# Patient Record
Sex: FEMALE | Race: WHITE | NOT HISPANIC OR LATINO | Employment: UNEMPLOYED | URBAN - METROPOLITAN AREA
[De-identification: names, ages, dates, MRNs, and addresses within clinical notes are randomized per-mention and may not be internally consistent; named-entity substitution may affect disease eponyms.]

---

## 2020-01-08 ENCOUNTER — OFFICE VISIT (OUTPATIENT)
Dept: URGENT CARE | Facility: MEDICAL CENTER | Age: 25
End: 2020-01-08
Payer: COMMERCIAL

## 2020-01-08 VITALS
BODY MASS INDEX: 29.44 KG/M2 | HEIGHT: 62 IN | RESPIRATION RATE: 18 BRPM | WEIGHT: 160 LBS | OXYGEN SATURATION: 100 % | SYSTOLIC BLOOD PRESSURE: 102 MMHG | DIASTOLIC BLOOD PRESSURE: 54 MMHG | HEART RATE: 75 BPM | TEMPERATURE: 97.8 F

## 2020-01-08 DIAGNOSIS — H10.9 CONJUNCTIVITIS OF LEFT EYE, UNSPECIFIED CONJUNCTIVITIS TYPE: Primary | ICD-10-CM

## 2020-01-08 PROCEDURE — 99213 OFFICE O/P EST LOW 20 MIN: CPT | Performed by: PHYSICIAN ASSISTANT

## 2020-01-08 RX ORDER — BENZONATATE 100 MG/1
CAPSULE ORAL
COMMUNITY
Start: 2020-01-05

## 2020-01-08 RX ORDER — POLYMYXIN B SULFATE AND TRIMETHOPRIM 1; 10000 MG/ML; [USP'U]/ML
1 SOLUTION OPHTHALMIC EVERY 4 HOURS
Qty: 10 ML | Refills: 0 | Status: SHIPPED | OUTPATIENT
Start: 2020-01-08 | End: 2020-01-15

## 2020-01-08 RX ORDER — FLUTICASONE PROPIONATE 50 MCG
SPRAY, SUSPENSION (ML) NASAL
COMMUNITY
Start: 2020-01-05

## 2020-01-08 RX ORDER — ALBUTEROL SULFATE 90 UG/1
AEROSOL, METERED RESPIRATORY (INHALATION)
COMMUNITY
Start: 2020-01-05

## 2020-01-08 NOTE — PATIENT INSTRUCTIONS
Conjunctivitis  polytrim as directed  Follow up with PCP in 3-5 days  Proceed to  ER if symptoms worsen  Conjunctivitis   WHAT YOU SHOULD KNOW:   Conjunctivitis, or pink eye, is inflammation of your conjunctiva  The conjunctiva is a thin tissue that covers the front of your eye and the back of your eyelids  The conjunctiva helps protect your eye and keep it moist         INSTRUCTIONS:   Medicines:   · Allergy medicine: This medicine helps decrease itchy, red, swollen eyes caused by allergies  It may be given as a pill, eye drops, or nasal spray  · Antibiotics:  You will need antibiotics if your conjunctivitis is caused by bacteria  This medicine may be given as eye drops or eye ointment  · Steroid medicine: This medicine helps decrease inflammation  It may be given as a pill, eye drops, or nasal spray  · Take your medicine as directed  Call your healthcare provider if you think your medicine is not helping or if you have side effects  Tell him if you are allergic to any medicine  Keep a list of the medicines, vitamins, and herbs you take  Include the amounts, and when and why you take them  Bring the list or the pill bottles to follow-up visits  Carry your medicine list with you in case of an emergency  Follow up with your primary healthcare provider as directed: You may need to return for more tests on your eyes  These will help your primary healthcare provider check for eye damage  Write down your questions so you remember to ask them during your visits  Avoid the spread of conjunctivitis:   · Wash your hands often:  Wash your hands before you touch your eyes  Also wash your hands before you prepare or eat food and after you use the bathroom or change a diaper  · Avoid allergens:  Try to avoid the things that cause your allergies, such as pets, dust, or grass  · Avoid contact:  Do not share towels or washcloths  Try to stay away from others as much as possible   Ask when you can return to work or school  · Throw away eye makeup:  Throw away mascara and other eye makeup  Manage your symptoms:  · Apply a cool compress:  Wet a washcloth with cold water and place it on your eye  This will help decrease swelling  · Use eye drops:  Eye drops, or artificial tears, can be bought without a doctor's order  They help keep your eye moist     · Do not wear contact lenses: They can irritate your eye  Throw away the pair you are using and ask when you can wear them again  Use a new pair of lenses when your primary healthcare provider says it is okay  · Flush your eye:  You may need to flush your eye with saline to help decrease your symptoms  Ask for more information on how to flush your eye  Contact your primary healthcare provider if:   · Your eyesight becomes blurry  · You have tiny bumps or spots of blood on your eye  · You have questions or concerns about your condition or care  Return to the emergency department if:   · The swelling in your eye gets worse, even after treatment  · Your vision suddenly becomes worse or you cannot see at all  · Your eye begins to bleed  © 2014 [address] is for End User's use only and may not be sold, redistributed or otherwise used for commercial purposes  All illustrations and images included in CareNotes® are the copyrighted property of [employer]  or [employer]  The above information is an  only  It is not intended as medical advice for individual conditions or treatments  Talk to your doctor, nurse or pharmacist before following any medical regimen to see if it is safe and effective for you

## 2020-01-08 NOTE — PROGRESS NOTES
[facility]        NAME: Nicolasa Fuentes is a 27 y o  female  : 1995    MRN: [id number]  DATE: 2020  TIME: 3:59 PM    Assessment and Plan   Conjunctivitis of left eye, unspecified conjunctivitis type [H10 9]  1  Conjunctivitis of left eye, unspecified conjunctivitis type           Patient Instructions     Conjunctivitis  polytrim as directed  Follow up with PCP in 3-5 days  Proceed to  ER if symptoms worsen  Chief Complaint     Chief Complaint   Patient presents with    Eye Problem     Skin surrounding pt's left eye is dry, burning and swollen x 1 week  History of Present Illness       28 y/o female presents c/o crusted lashes x 1 day and injected eye  Patient also c/o having used eczema cream on the eyelid and having some burning to the skin of the upper eyelid  Denies visual disturbances, eye pain, trauma      Review of Systems   Review of Systems   Constitutional: Negative  HENT: Negative  Eyes: Positive for discharge and redness  Negative for photophobia, pain, itching and visual disturbance  Respiratory: Negative  Negative for cough, chest tightness, shortness of breath, wheezing and stridor  Cardiovascular: Negative  Negative for chest pain, palpitations and leg swelling  Current Medications       Current Outpatient Medications:     albuterol (PROVENTIL HFA,VENTOLIN HFA) 90 mcg/act inhaler, , Disp: , Rfl:     benzonatate (TESSALON PERLES) 100 mg capsule, , Disp: , Rfl:     fluticasone (FLONASE) 50 mcg/act nasal spray, , Disp: , Rfl:     Prenatal w/o A Vit-Fe Fum-FA (PRENATA PO), Take by mouth, Disp: , Rfl:     Current Allergies     Allergies as of 2020    (No Known Allergies)            The following portions of the patient's history were reviewed and updated as appropriate: allergies, current medications, past family history, past medical history, past social history, past surgical history and problem list      History reviewed   No pertinent past medical history  History reviewed  No pertinent surgical history  Family History   Problem Relation Age of Onset    No Known Problems Mother     No Known Problems Father          Medications have been verified  Objective   /54   Pulse 75   Temp 97 8 °F (36 6 °C) (Temporal)   Resp 18   Ht 5' 2" (1 575 m)   Wt 72 6 kg (160 lb)   LMP 12/18/2019 (Approximate)   SpO2 100%   BMI 29 26 kg/m²        Physical Exam     Physical Exam   Constitutional: She appears well-developed and well-nourished  HENT:   Head: Normocephalic and atraumatic  Right Ear: External ear normal    Left Ear: External ear normal    Nose: Nose normal    Mouth/Throat: Oropharynx is clear and moist  No oropharyngeal exudate  Eyes: Pupils are equal, round, and reactive to light  EOM are normal  Right eye exhibits no chemosis, no discharge, no exudate and no hordeolum  No foreign body present in the right eye  Left eye exhibits exudate  Left eye exhibits no chemosis, no discharge and no hordeolum  No foreign body present in the left eye  Right conjunctiva is not injected  Right conjunctiva has no hemorrhage  Left conjunctiva is injected  Left conjunctiva has no hemorrhage  Neck: Normal range of motion  Neck supple  Cardiovascular: Normal rate, regular rhythm, normal heart sounds and intact distal pulses  Pulmonary/Chest: Effort normal and breath sounds normal  No stridor  No respiratory distress  She has no wheezes  She has no rales  She exhibits no tenderness  Lymphadenopathy:     She has no cervical adenopathy

## 2020-10-23 ENCOUNTER — HOSPITAL ENCOUNTER (EMERGENCY)
Facility: HOSPITAL | Age: 25
Discharge: HOME | End: 2020-10-23
Attending: EMERGENCY MEDICINE | Admitting: EMERGENCY MEDICINE

## 2020-10-23 VITALS
RESPIRATION RATE: 17 BRPM | HEART RATE: 63 BPM | HEIGHT: 63 IN | TEMPERATURE: 97.9 F | BODY MASS INDEX: 28.7 KG/M2 | WEIGHT: 162 LBS | OXYGEN SATURATION: 100 % | SYSTOLIC BLOOD PRESSURE: 118 MMHG | DIASTOLIC BLOOD PRESSURE: 62 MMHG

## 2020-10-23 DIAGNOSIS — J45.901 ASTHMA EXACERBATION: Primary | ICD-10-CM

## 2020-10-23 DIAGNOSIS — J40 BRONCHITIS: ICD-10-CM

## 2020-10-23 PROCEDURE — 99283 EMERGENCY DEPT VISIT LOW MDM: CPT

## 2020-10-23 PROCEDURE — 99284 EMERGENCY DEPT VISIT MOD MDM: CPT | Performed by: EMERGENCY MEDICINE

## 2020-10-23 RX ORDER — PREDNISONE 20 MG/1
40 TABLET ORAL DAILY
Qty: 10 TABLET | Refills: 0 | Status: SHIPPED | OUTPATIENT
Start: 2020-10-23 | End: 2020-10-28

## 2020-10-23 RX ORDER — ALBUTEROL SULFATE 90 UG/1
2 AEROSOL, METERED RESPIRATORY (INHALATION) ONCE
Status: COMPLETED | OUTPATIENT
Start: 2020-10-23 | End: 2020-10-23

## 2020-10-23 RX ORDER — AZITHROMYCIN 250 MG/1
250 TABLET, FILM COATED ORAL DAILY
Qty: 4 TABLET | Refills: 0 | Status: SHIPPED | OUTPATIENT
Start: 2020-10-23 | End: 2020-10-23 | Stop reason: CLARIF

## 2020-10-23 RX ORDER — AZITHROMYCIN 500 MG/1
500 TABLET, FILM COATED ORAL ONCE
Status: COMPLETED | OUTPATIENT
Start: 2020-10-23 | End: 2020-10-23

## 2020-10-23 RX ORDER — PREDNISONE 20 MG/1
60 TABLET ORAL ONCE
Status: COMPLETED | OUTPATIENT
Start: 2020-10-23 | End: 2020-10-23

## 2020-10-23 RX ORDER — AZITHROMYCIN 250 MG/1
250 TABLET, FILM COATED ORAL DAILY
Qty: 4 TABLET | Refills: 0 | Status: SHIPPED | OUTPATIENT
Start: 2020-10-23 | End: 2020-10-27

## 2020-10-23 RX ADMIN — ALBUTEROL SULFATE 2 PUFF: 90 AEROSOL, METERED RESPIRATORY (INHALATION) at 14:22

## 2020-10-23 RX ADMIN — AZITHROMYCIN 500 MG: 500 TABLET, FILM COATED ORAL at 14:22

## 2020-10-23 RX ADMIN — PREDNISONE 60 MG: 20 TABLET ORAL at 14:22

## 2021-12-19 ENCOUNTER — APPOINTMENT (EMERGENCY)
Dept: RADIOLOGY | Facility: HOSPITAL | Age: 26
End: 2021-12-19
Payer: COMMERCIAL

## 2021-12-19 ENCOUNTER — HOSPITAL ENCOUNTER (EMERGENCY)
Facility: HOSPITAL | Age: 26
Discharge: HOME | End: 2021-12-19
Attending: EMERGENCY MEDICINE | Admitting: EMERGENCY MEDICINE
Payer: COMMERCIAL

## 2021-12-19 VITALS
RESPIRATION RATE: 16 BRPM | SYSTOLIC BLOOD PRESSURE: 137 MMHG | OXYGEN SATURATION: 98 % | BODY MASS INDEX: 27.81 KG/M2 | TEMPERATURE: 97.8 F | HEART RATE: 82 BPM | WEIGHT: 157 LBS | DIASTOLIC BLOOD PRESSURE: 62 MMHG

## 2021-12-19 DIAGNOSIS — S61.309A NAIL AVULSION, FINGER, INITIAL ENCOUNTER: Primary | ICD-10-CM

## 2021-12-19 PROCEDURE — 73140 X-RAY EXAM OF FINGER(S): CPT

## 2021-12-19 PROCEDURE — 99283 EMERGENCY DEPT VISIT LOW MDM: CPT

## 2021-12-19 PROCEDURE — 11730 AVULSION NAIL PLATE SIMPLE 1: CPT | Performed by: EMERGENCY MEDICINE

## 2021-12-19 PROCEDURE — 99284 EMERGENCY DEPT VISIT MOD MDM: CPT | Performed by: EMERGENCY MEDICINE

## 2021-12-19 RX ORDER — AMOXICILLIN AND CLAVULANATE POTASSIUM 875; 125 MG/1; MG/1
1 TABLET, FILM COATED ORAL EVERY 12 HOURS
Qty: 14 TABLET | Refills: 0 | Status: SHIPPED | OUTPATIENT
Start: 2021-12-19 | End: 2021-12-26

## 2021-12-19 RX ORDER — LIDOCAINE HYDROCHLORIDE 10 MG/ML
5 INJECTION, SOLUTION EPIDURAL; INFILTRATION; INTRACAUDAL; PERINEURAL ONCE
Status: COMPLETED | OUTPATIENT
Start: 2021-12-19 | End: 2021-12-19

## 2021-12-19 RX ADMIN — LIDOCAINE HYDROCHLORIDE 5 ML: 10 INJECTION, SOLUTION EPIDURAL; INFILTRATION; INTRACAUDAL at 15:17

## 2022-06-14 ENCOUNTER — OFFICE VISIT (OUTPATIENT)
Dept: URGENT CARE | Facility: CLINIC | Age: 27
End: 2022-06-14
Payer: COMMERCIAL

## 2022-06-14 VITALS
OXYGEN SATURATION: 100 % | TEMPERATURE: 98.7 F | HEART RATE: 65 BPM | WEIGHT: 158 LBS | DIASTOLIC BLOOD PRESSURE: 59 MMHG | HEIGHT: 63 IN | RESPIRATION RATE: 18 BRPM | SYSTOLIC BLOOD PRESSURE: 98 MMHG | BODY MASS INDEX: 28 KG/M2

## 2022-06-14 DIAGNOSIS — H57.12 PAIN OF LEFT EYE: Primary | ICD-10-CM

## 2022-06-14 DIAGNOSIS — H21.569 ABNORMAL SIZE OF PUPIL: ICD-10-CM

## 2022-06-14 PROCEDURE — 99213 OFFICE O/P EST LOW 20 MIN: CPT | Performed by: NURSE PRACTITIONER

## 2022-06-14 RX ORDER — OFLOXACIN 3 MG/ML
SOLUTION/ DROPS OPHTHALMIC
COMMUNITY
Start: 2022-03-28

## 2022-06-14 RX ORDER — ATROPINE SULFATE 10 MG/ML
SOLUTION/ DROPS OPHTHALMIC
COMMUNITY
Start: 2022-03-28

## 2022-06-14 NOTE — PROGRESS NOTES
[facility]        NAME: Shailesh Dean is a 27 y o  female  : 1995    MRN: [id number]  DATE: 2022  TIME: 6:59 PM    Assessment and Plan   Pain of left eye [H57 12]  1  Pain of left eye  Transfer to other facility   2  Abnormal size of pupil       --No signs of overt FB, trauma noted  Consider hidden FB (glass), retained suture, acute angle glaucoma, other causes of eye pain, dilated pupil in patient with recent history of surgery for glass in eye  Emphasized need for urgent ophthalmologist exam this evening (local ER vs  [facility] in [place] where she had surgery--preferred)  Patient agreeable to this plan  Patient Instructions     --Advise going to ER this evening for ongoing eye pain, dilated pupil [facility] preferable)    Chief Complaint     Chief Complaint   Patient presents with    Eye Pain     Throbbing, piercing pain in Lt eye started this morning  Pt states she had Lt eye surgery back in 2022 (glass removed from eye)  Feels like something in eye  No burning  Mild redness  Pt states she was cleared for glasses 1 mth ago  Did not contact pcp or eye dr           History of Present Illness         Here with complaints of left eye pain  Woke up with it this morning  Throbbing, piercing sensation localized to medial superior portion of left eye (in area where she had surgery 3/2022 at [facility] for glass in eye)  Associated mild blurry vision, photophobia  Minimal tearing  Pain is constant, rated 7/10  No associated N/V  No concurrent URI symptoms  Does not recall any new injuries, trauma, FB  Eye was fine when she went to bed  Instilled antibiotic eye drops but no other drops (including atropine)  Wears glasses, not contacts lenses  Did not call eye doctor  Review of Systems   Review of Systems   Constitutional: Negative for fever  Eyes: Positive for photophobia, pain and visual disturbance  Negative for itching  Gastrointestinal: Negative for nausea and vomiting  Current Medications       Current Outpatient Medications:     albuterol (PROVENTIL HFA,VENTOLIN HFA) 90 mcg/act inhaler, , Disp: , Rfl:     atropine (ISOPTO ATROPINE) 1 % ophthalmic solution, , Disp: , Rfl:     ofloxacin (OCUFLOX) 0 3 % ophthalmic solution, APPLY 1 DROP 4 TIMES DAILY TO LEFT EYE, Disp: , Rfl:     benzonatate (TESSALON PERLES) 100 mg capsule, , Disp: , Rfl:     fluticasone (FLONASE) 50 mcg/act nasal spray, , Disp: , Rfl:     Prenatal w/o A Vit-Fe Fum-FA (PRENATA PO), Take by mouth (Patient not taking: Reported on 6/14/2022), Disp: , Rfl:     Current Allergies     Allergies as of 06/14/2022    (No Known Allergies)            The following portions of the patient's history were reviewed and updated as appropriate: allergies, current medications, past family history, past medical history, past social history, past surgical history and problem list      History reviewed  No pertinent past medical history  History reviewed  No pertinent surgical history  Family History   Problem Relation Age of Onset    No Known Problems Mother     No Known Problems Father          Medications have been verified  Objective   BP 98/59   Pulse 65   Temp 98 7 °F (37 1 °C) (Temporal)   Resp 18   Ht 5' 3" (1 6 m)   Wt 71 7 kg (158 lb)   SpO2 100%   BMI 27 99 kg/m²   No LMP recorded  Physical Exam     Physical Exam  Constitutional:       General: She is not in acute distress  Eyes:      General:         Right eye: No discharge  Left eye: No discharge  Extraocular Movements: Extraocular movements intact  Comments: Left pupil significantly dilated c/w right pupil  Minimally reactive (patient denies recent atropine eye drops; denies baseline pupil abnormality)  Left medial superior sclera (at about 10 o'clock position) with mild area of erythema, episcleritis surrounding what appears to be suture remnant    Does not remove with swab  Associated fluorescein uptake  No signs of FB noted otherwise, including with eversion of upper lid  Remainder of left pupil, sclera, conjunctiva with normal appearance  No ciliary  flush  Neurological:      Mental Status: She is alert

## 2022-08-24 ENCOUNTER — HOSPITAL ENCOUNTER (EMERGENCY)
Facility: HOSPITAL | Age: 27
Discharge: HOME | End: 2022-08-25
Attending: EMERGENCY MEDICINE
Payer: COMMERCIAL

## 2022-08-24 VITALS
HEART RATE: 75 BPM | SYSTOLIC BLOOD PRESSURE: 102 MMHG | DIASTOLIC BLOOD PRESSURE: 59 MMHG | TEMPERATURE: 98.7 F | OXYGEN SATURATION: 100 % | RESPIRATION RATE: 16 BRPM

## 2022-08-24 DIAGNOSIS — O26.899 ABDOMINAL PAIN IN PREGNANCY: ICD-10-CM

## 2022-08-24 DIAGNOSIS — R10.9 ABDOMINAL PAIN IN PREGNANCY: ICD-10-CM

## 2022-08-24 DIAGNOSIS — O20.9 VAGINAL BLEEDING AFFECTING EARLY PREGNANCY: ICD-10-CM

## 2022-08-24 DIAGNOSIS — N39.0 UTI (URINARY TRACT INFECTION): ICD-10-CM

## 2022-08-24 DIAGNOSIS — O36.80X0 PREGNANCY OF UNKNOWN ANATOMIC LOCATION: Primary | ICD-10-CM

## 2022-08-24 LAB
ALBUMIN SERPL BCP-MCNC: 4.1 G/DL (ref 3.5–5)
ALP SERPL-CCNC: 61 U/L (ref 34–104)
ALT SERPL W P-5'-P-CCNC: 11 U/L (ref 7–52)
ANION GAP SERPL CALCULATED.3IONS-SCNC: 7 MMOL/L (ref 4–13)
AST SERPL W P-5'-P-CCNC: 12 U/L (ref 13–39)
BASOPHILS # BLD AUTO: 0.03 THOUSANDS/ΜL (ref 0–0.1)
BASOPHILS NFR BLD AUTO: 0 % (ref 0–1)
BILIRUB SERPL-MCNC: 0.56 MG/DL (ref 0.2–1)
BUN SERPL-MCNC: 6 MG/DL (ref 5–25)
CALCIUM SERPL-MCNC: 9 MG/DL (ref 8.4–10.2)
CHLORIDE SERPL-SCNC: 106 MMOL/L (ref 96–108)
CO2 SERPL-SCNC: 25 MMOL/L (ref 21–32)
CREAT SERPL-MCNC: 0.58 MG/DL (ref 0.6–1.3)
EOSINOPHIL # BLD AUTO: 0.35 THOUSAND/ΜL (ref 0–0.61)
EOSINOPHIL NFR BLD AUTO: 3 % (ref 0–6)
ERYTHROCYTE [DISTWIDTH] IN BLOOD BY AUTOMATED COUNT: 13.5 % (ref 11.6–15.1)
GFR SERPL CREATININE-BSD FRML MDRD: 129 ML/MIN/1.73SQ M
GLUCOSE SERPL-MCNC: 92 MG/DL (ref 65–140)
HCT VFR BLD AUTO: 38.4 % (ref 34.8–46.1)
HGB BLD-MCNC: 12.9 G/DL (ref 11.5–15.4)
IMM GRANULOCYTES # BLD AUTO: 0.04 THOUSAND/UL (ref 0–0.2)
IMM GRANULOCYTES NFR BLD AUTO: 0 % (ref 0–2)
LYMPHOCYTES # BLD AUTO: 2.79 THOUSANDS/ΜL (ref 0.6–4.47)
LYMPHOCYTES NFR BLD AUTO: 26 % (ref 14–44)
MCH RBC QN AUTO: 29.9 PG (ref 26.8–34.3)
MCHC RBC AUTO-ENTMCNC: 33.6 G/DL (ref 31.4–37.4)
MCV RBC AUTO: 89 FL (ref 82–98)
MONOCYTES # BLD AUTO: 0.67 THOUSAND/ΜL (ref 0.17–1.22)
MONOCYTES NFR BLD AUTO: 6 % (ref 4–12)
NEUTROPHILS # BLD AUTO: 6.99 THOUSANDS/ΜL (ref 1.85–7.62)
NEUTS SEG NFR BLD AUTO: 65 % (ref 43–75)
NRBC BLD AUTO-RTO: 0 /100 WBCS
PLATELET # BLD AUTO: 288 THOUSANDS/UL (ref 149–390)
PMV BLD AUTO: 10.9 FL (ref 8.9–12.7)
POTASSIUM SERPL-SCNC: 3.4 MMOL/L (ref 3.5–5.3)
PROT SERPL-MCNC: 6.6 G/DL (ref 6.4–8.4)
RBC # BLD AUTO: 4.31 MILLION/UL (ref 3.81–5.12)
SODIUM SERPL-SCNC: 138 MMOL/L (ref 135–147)
WBC # BLD AUTO: 10.87 THOUSAND/UL (ref 4.31–10.16)

## 2022-08-24 PROCEDURE — 99284 EMERGENCY DEPT VISIT MOD MDM: CPT

## 2022-08-24 PROCEDURE — 80053 COMPREHEN METABOLIC PANEL: CPT | Performed by: EMERGENCY MEDICINE

## 2022-08-24 PROCEDURE — 85025 COMPLETE CBC W/AUTO DIFF WBC: CPT | Performed by: EMERGENCY MEDICINE

## 2022-08-24 PROCEDURE — 36415 COLL VENOUS BLD VENIPUNCTURE: CPT

## 2022-08-24 PROCEDURE — 84702 CHORIONIC GONADOTROPIN TEST: CPT

## 2022-08-24 PROCEDURE — 83690 ASSAY OF LIPASE: CPT

## 2022-08-24 NOTE — Clinical Note
Delores Kayser was seen and treated in our emergency department on 8/24/2022  No restrictions            Diagnosis:     Christi Grande  may return to work on return date  She may return on this date: 08/27/2022    May return earlier than date listed if experiencing improvement in symptoms  If you have any questions or concerns, please don't hesitate to call        Scott Gutierrez MD    ______________________________           _______________          _______________  Hospital Representative                              Date                                Time

## 2022-08-24 NOTE — Clinical Note
Gauri Bey was seen and treated in our emergency department on 8/24/2022  No restrictions            Diagnosis:     Debbie Abdalla  may return to work on return date  She may return on this date: 08/27/2022    May return earlier than date listed if experiencing improvement in symptoms  If you have any questions or concerns, please don't hesitate to call        Meghna Dolan MD    ______________________________           _______________          _______________  Hospital Representative                              Date                                Time

## 2022-08-25 ENCOUNTER — APPOINTMENT (OUTPATIENT)
Dept: ULTRASOUND IMAGING | Facility: HOSPITAL | Age: 27
End: 2022-08-25
Payer: COMMERCIAL

## 2022-08-25 LAB
ABO GROUP BLD: NORMAL
ABO GROUP BLD: NORMAL
B-HCG SERPL-ACNC: 5744 MIU/ML (ref 0–11.6)
BACTERIA UR QL AUTO: ABNORMAL /HPF
BILIRUB UR QL STRIP: NEGATIVE
BLD GP AB SCN SERPL QL: NEGATIVE
CLARITY UR: CLEAR
COLOR UR: YELLOW
GLUCOSE UR STRIP-MCNC: NEGATIVE MG/DL
HGB UR QL STRIP.AUTO: ABNORMAL
KETONES UR STRIP-MCNC: ABNORMAL MG/DL
LEUKOCYTE ESTERASE UR QL STRIP: ABNORMAL
LIPASE SERPL-CCNC: 7 U/L (ref 11–82)
MUCOUS THREADS UR QL AUTO: ABNORMAL
NITRITE UR QL STRIP: NEGATIVE
NON-SQ EPI CELLS URNS QL MICRO: ABNORMAL /HPF
PH UR STRIP.AUTO: 6.5 [PH]
PROT UR STRIP-MCNC: ABNORMAL MG/DL
RBC #/AREA URNS AUTO: ABNORMAL /HPF
RH BLD: POSITIVE
RH BLD: POSITIVE
SP GR UR STRIP.AUTO: 1.03 (ref 1–1.03)
SPECIMEN EXPIRATION DATE: NORMAL
UROBILINOGEN UR STRIP-ACNC: 2 MG/DL
WBC #/AREA URNS AUTO: ABNORMAL /HPF

## 2022-08-25 PROCEDURE — 81001 URINALYSIS AUTO W/SCOPE: CPT

## 2022-08-25 PROCEDURE — 86900 BLOOD TYPING SEROLOGIC ABO: CPT

## 2022-08-25 PROCEDURE — 86850 RBC ANTIBODY SCREEN: CPT

## 2022-08-25 PROCEDURE — 99284 EMERGENCY DEPT VISIT MOD MDM: CPT | Performed by: EMERGENCY MEDICINE

## 2022-08-25 PROCEDURE — 86901 BLOOD TYPING SEROLOGIC RH(D): CPT

## 2022-08-25 PROCEDURE — 76815 OB US LIMITED FETUS(S): CPT

## 2022-08-25 PROCEDURE — 36415 COLL VENOUS BLD VENIPUNCTURE: CPT

## 2022-08-25 RX ORDER — CEPHALEXIN 500 MG/1
500 CAPSULE ORAL EVERY 12 HOURS SCHEDULED
Qty: 14 CAPSULE | Refills: 0 | Status: SHIPPED | OUTPATIENT
Start: 2022-08-25 | End: 2022-09-01

## 2022-08-25 NOTE — ED PROVIDER NOTES
History  Chief Complaint   Patient presents with    Abdominal Pain Pregnant     Pt states she is approx 7 weeks pregnant, light bleeding starting yesterday that has worsened today, comparable to a period  C/o lower abdominal pain, nausea  [place] gave zofran and prenatals  Second pregnancy  Fariba Pickering is a 27 y o  person presenting to [facility] ED for "spotting"  Patient had positive home pregnancy test last Wednesday after which she presented at 10:00 a m  she had IUD removed in June and became pregnant shortly after  On Thursday of last week, she presented to [facility] ED where she received blood work and was informed that she was about 8 weeks pregnant  There, she also was noted to have temperature of 99 F  Patient has been nauseous with vomiting, for which she was prescribed Zofran and is taking that daily  Yesterday, patient noted spotting  Today, patient noticed increased bloody spotting and developed a crampy, dull, achy abdominal pain similar to that of menstrual pain  Patient reports headache, abdominal cramps, and occasional sharp vaginal pain  Patient endorses 1 prior pregnancy, which resulted in full-term birth at 42 weeks via vaginal delivery  At time of her now 1year-old child's birth, patient was informed that her child measured 34 weeks and patient was informed that was because the placenta was not delivering nutrients appropriately  Patient also reports increased stress, related to her relationship with the father of her child  Patient denies dizziness, dysuria, constipation, diarrhea, chest pain, and shortness of breath  History provided by:  Patient (with patient's friend present)         Prior to Admission Medications   Prescriptions Last Dose Informant Patient Reported? Taking?    Prenatal w/o A Vit-Fe Fum-FA (PRENATA PO)  Self Yes No   Sig: Take by mouth   Patient not taking: Reported on 6/14/2022   albuterol (PROVENTIL HFA,VENTOLIN HFA) 90 mcg/act inhaler Self Yes No   atropine (ISOPTO ATROPINE) 1 % ophthalmic solution   Yes No   benzonatate (TESSALON PERLES) 100 mg capsule  Self Yes No   Patient not taking: Reported on 6/14/2022   fluticasone (FLONASE) 50 mcg/act nasal spray  Self Yes No   Patient not taking: Reported on 6/14/2022   ofloxacin (OCUFLOX) 0 3 % ophthalmic solution   Yes No   Sig: APPLY 1 DROP 4 TIMES DAILY TO LEFT EYE      Facility-Administered Medications: None       History reviewed  No pertinent past medical history  Past Surgical History:   Procedure Laterality Date    EYE SURGERY Left     2022, glass removed       Family History   Problem Relation Age of Onset    No Known Problems Mother     No Known Problems Father      I have reviewed and agree with the history as documented  E-Cigarette/Vaping    E-Cigarette Use Never User      E-Cigarette/Vaping Substances     Social History     Tobacco Use    Smoking status: Light Tobacco Smoker     Years: 7 00     Types: Cigarettes    Smokeless tobacco: Never Used    Tobacco comment: 2 cigarettes a day; stopped July 2022 when pregnant   Vaping Use    Vaping Use: Never used   Substance Use Topics    Alcohol use: Not Currently    Drug use: Not Currently        Review of Systems   Constitutional: Negative for activity change  HENT: Negative for sore throat  Respiratory: Negative for shortness of breath  Cardiovascular: Negative for chest pain  Gastrointestinal: Positive for abdominal pain (dull, achy cramps), nausea and vomiting  Negative for constipation and diarrhea  Genitourinary: Positive for vaginal bleeding  Negative for dysuria  Neurological: Positive for headaches  Negative for dizziness  All other systems reviewed and are negative        Physical Exam  ED Triage Vitals [08/24/22 2223]   Temperature Pulse Respirations Blood Pressure SpO2   98 7 °F (37 1 °C) 75 16 102/59 100 %      Temp Source Heart Rate Source Patient Position - Orthostatic VS BP Location FiO2 (%)   Oral Monitor -- Left arm --      Pain Score       7             Orthostatic Vital Signs  Vitals:    08/24/22 2223   BP: 102/59   Pulse: 75       Physical Exam  Constitutional:       General: She is not in acute distress  Appearance: Normal appearance  She is not ill-appearing, toxic-appearing or diaphoretic  HENT:      Head: Normocephalic and atraumatic  Right Ear: External ear normal       Left Ear: External ear normal       Nose: Nose normal  No congestion or rhinorrhea  Mouth/Throat:      Mouth: Mucous membranes are moist       Pharynx: No oropharyngeal exudate or posterior oropharyngeal erythema  Eyes:      General: No scleral icterus  Extraocular Movements: Extraocular movements intact  Conjunctiva/sclera: Conjunctivae normal    Cardiovascular:      Rate and Rhythm: Normal rate and regular rhythm  Pulses: Normal pulses  Heart sounds: Normal heart sounds  No murmur heard  No friction rub  No gallop  Pulmonary:      Effort: Pulmonary effort is normal  No respiratory distress  Breath sounds: Normal breath sounds  Abdominal:      General: Abdomen is flat  Bowel sounds are normal  There is distension  Palpations: There is no mass  Tenderness: There is no abdominal tenderness  Hernia: No hernia is present  Musculoskeletal:         General: No swelling  Normal range of motion  Cervical back: Normal range of motion and neck supple  Right lower leg: No edema  Left lower leg: No edema  Skin:     General: Skin is warm and dry  Capillary Refill: Capillary refill takes less than 2 seconds  Findings: No erythema  Neurological:      General: No focal deficit present  Mental Status: She is alert     Psychiatric:         Mood and Affect: Mood normal          Behavior: Behavior normal          ED Medications  Medications - No data to display    Diagnostic Studies  Results Reviewed     Procedure Component Value Units Date/Time Quantitative hCG [[id number]]  (Abnormal) Collected: 08/24/22 2235    Lab Status: Final result Specimen: Blood from Arm, Left Updated: 08/25/22 0325     HCG, Quant 5,744 mIU/mL     Narrative:       Expected Ranges:     Approximate               Approximate HCG  Gestation age          Concentration ( mIU/mL)  _____________          ______________________   [facility]                      HCG values  0 2-1                       5-50  1-2                           2-3                         100-5000  3-4                         500-21566  4-5                         1000-72897  5-6                         05122-847320  6-8                         59294-045133  8-12                        12925-723967      Lipase [[id number]]  (Abnormal) Collected: 08/24/22 2235    Lab Status: Final result Specimen: Blood from Arm, Left Updated: 08/25/22 0309     Lipase 7 u/L     Urine Microscopic [[id number]]  (Abnormal) Collected: 08/25/22 0031    Lab Status: Final result Specimen: Urine, Clean Catch Updated: 08/25/22 0129     RBC, UA 1-2 /hpf      WBC, UA Innumerable /hpf      Epithelial Cells Occasional /hpf      Bacteria, UA Occasional /hpf      MUCUS THREADS Moderate    UA (URINE) with reflex to Scope [[id number]]  (Abnormal) Collected: 08/25/22 0031    Lab Status: Final result Specimen: Urine, Clean Catch Updated: 08/25/22 0035     Color, UA Yellow     Clarity, UA Clear     Specific Gravity, UA 1 028     pH, UA 6 5     Leukocytes, UA Large     Nitrite, UA Negative     Protein, UA Trace mg/dl      Glucose, UA Negative mg/dl      Ketones, UA Trace mg/dl      Urobilinogen, UA 2 0 mg/dl      Bilirubin, UA Negative     Occult Blood, UA Large    Comprehensive metabolic panel [[id number]]  (Abnormal) Collected: 08/24/22 2235    Lab Status: Final result Specimen: Blood from Arm, Left Updated: 08/24/22 2301     Sodium 138 mmol/L      Potassium 3 4 mmol/L      Chloride 106 mmol/L      CO2 25 mmol/L      ANION GAP 7 mmol/L      BUN 6 mg/dL Creatinine 0 58 mg/dL      Glucose 92 mg/dL      Calcium 9 0 mg/dL      AST 12 U/L      ALT 11 U/L      Alkaline Phosphatase 61 U/L      Total Protein 6 6 g/dL      Albumin 4 1 g/dL      Total Bilirubin 0 56 mg/dL      eGFR 129 ml/min/1 73sq m     Narrative:      [place] guidelines for Chronic Kidney Disease (CKD):     Stage 1 with normal or high GFR (GFR > 90 mL/min/1 73 square meters)    Stage 2 Mild CKD (GFR = 60-89 mL/min/1 73 square meters)    Stage 3A Moderate CKD (GFR = 45-59 mL/min/1 73 square meters)    Stage 3B Moderate CKD (GFR = 30-44 mL/min/1 73 square meters)    Stage 4 Severe CKD (GFR = 15-29 mL/min/1 73 square meters)    Stage 5 End Stage CKD (GFR <15 mL/min/1 73 square meters)  Note: GFR calculation is accurate only with a steady state creatinine    CBC and differential [[id number]]  (Abnormal) Collected: 08/24/22 2235    Lab Status: Final result Specimen: Blood from Arm, Left Updated: 08/24/22 2242     WBC 10 87 Thousand/uL      RBC 4 31 Million/uL      Hemoglobin 12 9 g/dL      Hematocrit 38 4 %      MCV 89 fL      MCH 29 9 pg      MCHC 33 6 g/dL      RDW 13 5 %      MPV 10 9 fL      Platelets 345 Thousands/uL      nRBC 0 /100 WBCs      Neutrophils Relative 65 %      Immat GRANS % 0 %      Lymphocytes Relative 26 %      Monocytes Relative 6 %      Eosinophils Relative 3 %      Basophils Relative 0 %      Neutrophils Absolute 6 99 Thousands/µL      Immature Grans Absolute 0 04 Thousand/uL      Lymphocytes Absolute 2 79 Thousands/µL      Monocytes Absolute 0 67 Thousand/µL      Eosinophils Absolute 0 35 Thousand/µL      Basophils Absolute 0 03 Thousands/µL                  US OB pregnancy limited with transvaginal   ED Interpretation by Pati Freed MD (08/25 0302)   FINDINGS:     UTERUS:  The uterus is anteverted in position, measuring 9 7 x 5 8 x 7 7 cm     Contour and echotexture appear normal   The cervix is closed and within normal limits      ENDOMETRIUM: Fluid within the endometrium is seen  No definite evidence of intrauterine gestational sac     OVARIES/ADNEXA:  No adnexal mass evident  There is no free fluid      Right ovary:  1 8 x 1 8 x 3 1 cm  Volume 5 2  Doppler flow present  No suspicious abnormality      Left ovary:  2 1 x 1 8 x 1 8 cm  Volume  3 7  Doppler flow present  No suspicious abnormality      IMPRESSION:  Fluid within the endometrium with no definite intrauterine gestational sac or  Differential remains early IUP, spontaneous  and ectopic pregnancy  Correlate with serial quantitative BHCG  Final Result by Fe Andrews DO ( 0256)   Fluid within the endometrium with no definite intrauterine gestational sac or   Differential remains early IUP, spontaneous  and ectopic pregnancy  Correlate with serial quantitative BHCG  Workstation performed: [id number]               Procedures  Procedures      ED Course  ED Course as of 22 0502   Thu Aug 25, 2022   0305 Quantitative hCG  Per lab, Greater than 1425, will dilute and repeat  Final will be resulted at 03:23  MDM  Number of Diagnoses or Management Options  Abdominal pain in pregnancy  UTI (urinary tract infection)  Vaginal bleeding affecting early pregnancy  Diagnosis management comments: Clinically, the patient was found to have /59, HR 75, RR 16, saturating at 100% on RA, and in no acute respiratory distress  DDx including but not limited to: ectopic pregnancy, threatened , missed , incomplete , anemia, coagulopathy, DUB, tumor, retained products of conception, PCOS; doubt ovarian torsion or ruptured ovarian cyst      CBC, CMP, Type & Screen, Lipase, quantitative hCG, UA with reflex to scope, and US pelvis complete w transvaginal were ordered and resulted data was reviewed  Of note, quantitative hCG was 5744   US showed "Fluid within the endometrium with no definite intrauterine gestational sac or  Differential remains early IUP, spontaneous  and ectopic pregnancy  Correlate with serial quantitative BHCG"  Reached out to [facility] who recommended 48 hour repeat quantitative hCG and follow up ultrasound as outpatient  Patient disposition was discharge home with outpatient follow up with PCP and OB Gyn  Patient was prescribed cephalexin 500 mg BID for 7 days for UTI coverage due to innumerable WBC in urine  Disposition  Final diagnoses:   Abdominal pain in pregnancy   UTI (urinary tract infection)   Vaginal bleeding affecting early pregnancy     Time reflects when diagnosis was documented in both MDM as applicable and the Disposition within this note     Time User Action Codes Description Comment    2022  4:05 AM Franky, [address] Pregnancy of unknown anatomic location     2022  4:09 AM Briseida Horn Add [O26 899,  R10 9] Abdominal pain in pregnancy     2022  4:09 AM Briseida Horn Add [N39 0] UTI (urinary tract infection)     2022  4:09 AM Briseida Hernandez Add [O20 9] Vaginal bleeding affecting early pregnancy       ED Disposition     ED Disposition   Discharge    Condition   Stable    Date/Time   Thu Aug 25, 2022  4:09 AM    Comment   [address] [facility] discharge to home/self care  Follow-up Information     Follow up With Specialties Details Why Contact Info Additional [facility] Medicine Schedule an appointment as soon as possible for a visit  For re-evaluation  [facility] [address]  [address] , [place], [place] [facility] [place], [address] [facility] Gynecology Schedule an appointment as soon as possible for a visit  For re-evaluation and follow-up care   [address] [facility] [address]  [address], [facility] [address]   [address] Emergency Department Emergency Medicine Go to  As needed, If symptoms worsen or persist of if you develop heavy vaginal bleeding, lightheadedness, dizziness, experience loss of consciousness, feel faint, or experience any acute changes  [address] [facility] [address] [facility] Emergency Department, [address], [place], [place], [address]          Discharge Medication List as of 8/25/2022  4:31 AM      START taking these medications    Details   cephalexin (KEFLEX) 500 mg capsule Take 1 capsule (500 mg total) by mouth every 12 (twelve) hours for 7 days, Starting Thu 8/25/2022, Until Thu 9/1/2022, Normal         CONTINUE these medications which have NOT CHANGED    Details   albuterol (PROVENTIL HFA,VENTOLIN HFA) 90 mcg/act inhaler Starting Sun 1/5/2020, Historical Med      atropine (ISOPTO ATROPINE) 1 % ophthalmic solution Starting Mon 3/28/2022, Historical Med      benzonatate (TESSALON PERLES) 100 mg capsule Starting Sun 1/5/2020, Historical Med      fluticasone (FLONASE) 50 mcg/act nasal spray Starting Sun 1/5/2020, Historical Med      ofloxacin (OCUFLOX) 0 3 % ophthalmic solution APPLY 1 DROP 4 TIMES DAILY TO LEFT EYE, Historical Med      Prenatal w/o A Vit-Fe Fum-FA (PRENATA PO) Take by mouth, Historical Med           Outpatient Discharge Orders   hCG, quantitative   Standing Status: Future Standing Exp  Date: 08/27/23       PDMP Review     None           ED Provider  Attending physically available and evaluated Cathie Salcedoayla NEWBERRY managed the patient along with the ED Attending      Electronically Signed by       Vivian Rollins MD  08/25/22 0707

## 2022-08-25 NOTE — QUICK NOTE
Patient seen at bedside  Called by ED provider to discuss ultrasound results  Ultrasound showed fluid within the endometrium with no definite intrauterine gestational sac  Ultrasound is inconclusive and differential remains early IUP, spontaneous , and ectopic pregnancy  Radiology recommended serial quantitative beta HCGs  Beta hCG here was 5744  Patient reports that she has been spotting on and off for the last week however she denies any heavy vaginal bleeding  She also reports some intermittent cramping  She has a appointment scheduled with [facility] OB gyn on 2022  Discussed with patient that because results are inconclusive that we will have to repeat her quant level in 48 hours, and depending on what that level is, will plan for repeat ultrasound in 2 weeks  Patient was tearful at bedside but understanding  Patient was given return precautions  All questions answered to patient's satisfaction  Plan was discussed with ED provider      Tereza Cramer MD  22

## 2022-08-25 NOTE — DISCHARGE INSTRUCTIONS
Call primary care physician to schedule visit for re-evaluation  Call OB GYN to schedule visit for re-evaluation and follow-up care  Complete repeat blood work in 48 hours, and schedule ultrasound appointment with OB following blood work  Return to ED or call 9-1-1 if symptoms worsen or persist of if you develop heavy vaginal bleeding, lightheadedness, dizziness, experience loss of consciousness, feel faint, or experience any acute changes

## 2025-03-27 NOTE — ED ATTENDING ATTESTATION
8/24/2022  IrYis MD, saw and evaluated the patient  I have discussed the patient with the resident/non-physician practitioner and agree with the resident's/non-physician practitioner's findings, Plan of Care, and MDM as documented in the resident's/non-physician practitioner's note, except where noted  All available labs and Radiology studies were reviewed  I was present for key portions of any procedure(s) performed by the resident/non-physician practitioner and I was immediately available to provide assistance  At this point I agree with the current assessment done in the Emergency Department  I have conducted an independent evaluation of this patient a history and physical is as follows:    ED Course         Critical Care Time  Procedures    Patient is a pleasant 27 yof who presents with crampy lower abdominal pain  No focal neuro deficits  Notes that she is pregnant  No chest pain      + mild vaginal bleeding  MDM pleasant 27 yof, will rule out ectopic  Indication: Provided medical care services as part of ongoing care related to the patient's single, serious or complex chronic condition. Do Not Use If Visit Has Modifier 25 And Other Service Is Not A Preventive Service, Immunization, Or Annual Wellness Visit: : Visit complexity inherent to evaluation and management associated with medical care services that serve as the continuing focal point for all needed health care services and/or with medical care services that are part of ongoing care related to a patient’s single, serious, or complex chronic condition Detail Level: Simple